# Patient Record
Sex: FEMALE | Race: BLACK OR AFRICAN AMERICAN | NOT HISPANIC OR LATINO | Employment: FULL TIME | URBAN - METROPOLITAN AREA
[De-identification: names, ages, dates, MRNs, and addresses within clinical notes are randomized per-mention and may not be internally consistent; named-entity substitution may affect disease eponyms.]

---

## 2017-07-14 ENCOUNTER — OFFICE VISIT (OUTPATIENT)
Dept: DERMATOLOGY | Facility: CLINIC | Age: 32
End: 2017-07-14
Payer: COMMERCIAL

## 2017-07-14 DIAGNOSIS — L81.9 HYPERPIGMENTATION: Primary | ICD-10-CM

## 2017-07-14 DIAGNOSIS — L70.0 ACNE VULGARIS: ICD-10-CM

## 2017-07-14 PROCEDURE — 99999 PR PBB SHADOW E&M-EST. PATIENT-LVL III: CPT | Mod: PBBFAC,,, | Performed by: DERMATOLOGY

## 2017-07-14 PROCEDURE — 99201 PR OFFICE/OUTPT VISIT,NEW,LEVL I: CPT | Mod: S$GLB,,, | Performed by: DERMATOLOGY

## 2017-07-14 NOTE — PROGRESS NOTES
Subjective:       Patient ID:  Te Headley is a 32 y.o. female who presents for   Chief Complaint   Patient presents with    Hyperpigmentation     under both eyes x years, allergy problems taking allergy tabs 10mg qday      Pt here today for evaluation of dark coloration to under eyes.     Last seen here 5/7/14 with EMMIE Calzada NP for evaluation of Acne. Was started on Doryx, acanya gel, and Avita 0.025% gel. Pt reports treatment helped with acne. Has been off of medications for over 1 year with no flares. Using Cetaphil face wash BID which maintains acne.       Hyperpigmentation  - Initial  Affected locations: bilateral under eyes.  Duration: 2 years (Worsening over last year)  Signs / symptoms: asymptomatic  Timing: intermittent  Aggravated by: nothing  Relieving factors/Treatments tried: nothing (Denies sunscreen use)        Review of Systems   HENT: Negative for nosebleeds and headaches.    Eyes: Positive for eye watering ( x 1 year).   Gastrointestinal: Negative for diarrhea and Sensitivity to oral antibiotics.   Genitourinary: Negative for irregular periods (Not on BC).   Musculoskeletal: Negative for arthralgias.   Skin: Positive for tendency to form keloidal scars and recent sunburn (shoulders and chest- peeling, 1 month ago). Negative for daily sunscreen use, activity-related sunscreen use and wears hat.   Neurological: Negative for headaches.   Psychiatric/Behavioral: Negative for depressed mood.   Allergic/Immunologic: Positive for environmental allergies.        Objective:    Physical Exam   Constitutional: She appears well-developed and well-nourished. No distress.   Neurological: She is alert and oriented to person, place, and time.   Skin:   Areas Examined (abnormalities noted in diagram):   Head / Face Inspection Performed              Diagram Legend     Erythematous scaling macule/papule c/w actinic keratosis       Vascular papule c/w angioma      Pigmented verrucoid papule/plaque c/w  seborrheic keratosis      Yellow umbilicated papule c/w sebaceous hyperplasia      Irregularly shaped tan macule c/w lentigo     1-2 mm smooth white papules consistent with Milia      Movable subcutaneous cyst with punctum c/w epidermal inclusion cyst      Subcutaneous movable cyst c/w pilar cyst      Firm pink to brown papule c/w dermatofibroma      Pedunculated fleshy papule(s) c/w skin tag(s)      Evenly pigmented macule c/w junctional nevus     Mildly variegated pigmented, slightly irregular-bordered macule c/w mildly atypical nevus      Flesh colored to evenly pigmented papule c/w intradermal nevus       Pink pearly papule/plaque c/w basal cell carcinoma      Erythematous hyperkeratotic cursted plaque c/w SCC      Surgical scar with no sign of skin cancer recurrence      Open and closed comedones      Inflammatory papules and pustules      Verrucoid papule consistent consistent with wart     Erythematous eczematous patches and plaques     Dystrophic onycholytic nail with subungual debris c/w onychomycosis     Umbilicated papule    Erythematous-base heme-crusted tan verrucoid plaque consistent with inflamed seborrheic keratosis     Erythematous Silvery Scaling Plaque c/w Psoriasis     See annotation      Assessment / Plan:        Acne vulgaris  Clear     Hyperpigmentation-  Infraorbital  Discussed multifactoral nature - allergies, genetics, eye placement, age, and thin skin  Discussed treatment options- Teamine, sun screen, and Moe            Return if symptoms worsen or fail to improve.

## 2020-11-05 ENCOUNTER — OCCUPATIONAL HEALTH (OUTPATIENT)
Dept: URGENT CARE | Facility: CLINIC | Age: 35
End: 2020-11-05

## 2020-11-05 DIAGNOSIS — Z11.1 SCREENING FOR TUBERCULOSIS: Primary | ICD-10-CM

## 2020-11-05 PROCEDURE — 86580 POCT TB SKIN TEST: ICD-10-PCS | Mod: S$GLB,,, | Performed by: PHYSICIAN ASSISTANT

## 2020-11-05 PROCEDURE — 86580 TB INTRADERMAL TEST: CPT | Mod: S$GLB,,, | Performed by: PHYSICIAN ASSISTANT

## 2020-11-08 LAB
TB INDURATION - 48 HR READ: 0 MM
TB INDURATION - 72 HR READ: 0 MM
TB SKIN TEST - 48 HR READ: NEGATIVE
TB SKIN TEST - 72 HR READ: NEGATIVE

## 2020-12-03 ENCOUNTER — OCCUPATIONAL HEALTH (OUTPATIENT)
Dept: URGENT CARE | Facility: CLINIC | Age: 35
End: 2020-12-03

## 2020-12-03 DIAGNOSIS — Z02.83 ENCOUNTER FOR DRUG SCREENING: Primary | ICD-10-CM

## 2020-12-03 LAB
CTP QC/QA: YES
POC 5 PANEL DRUG SCREEN: NEGATIVE

## 2020-12-03 PROCEDURE — 80305 DRUG TEST PRSMV DIR OPT OBS: CPT | Mod: QW,S$GLB,, | Performed by: NURSE PRACTITIONER

## 2020-12-03 PROCEDURE — 80305 POCT RAPID DRUG SCREEN 5 PANEL: ICD-10-PCS | Mod: QW,S$GLB,, | Performed by: NURSE PRACTITIONER
